# Patient Record
Sex: MALE | Race: WHITE | NOT HISPANIC OR LATINO | Employment: OTHER | URBAN - METROPOLITAN AREA
[De-identification: names, ages, dates, MRNs, and addresses within clinical notes are randomized per-mention and may not be internally consistent; named-entity substitution may affect disease eponyms.]

---

## 2024-09-06 ENCOUNTER — OFFICE VISIT (OUTPATIENT)
Dept: URGENT CARE | Facility: CLINIC | Age: 59
End: 2024-09-06
Payer: COMMERCIAL

## 2024-09-06 VITALS
DIASTOLIC BLOOD PRESSURE: 85 MMHG | OXYGEN SATURATION: 97 % | HEART RATE: 88 BPM | SYSTOLIC BLOOD PRESSURE: 141 MMHG | TEMPERATURE: 98 F | RESPIRATION RATE: 18 BRPM

## 2024-09-06 DIAGNOSIS — H57.89 IRRITATION OF EYE: ICD-10-CM

## 2024-09-06 DIAGNOSIS — K12.2 CELLULITIS OF ORAL SOFT TISSUES: Primary | ICD-10-CM

## 2024-09-06 PROBLEM — R41.1 ANTEROGRADE AMNESIA: Status: ACTIVE | Noted: 2024-01-24

## 2024-09-06 PROBLEM — G37.2: Status: ACTIVE | Noted: 2023-10-30

## 2024-09-06 PROBLEM — I60.9 SUBARACHNOID HEMORRHAGE (HCC): Status: ACTIVE | Noted: 2024-01-12

## 2024-09-06 PROBLEM — N18.2 CHRONIC KIDNEY DISEASE, STAGE 2 (MILD): Chronic | Status: ACTIVE | Noted: 2024-05-13

## 2024-09-06 PROBLEM — R41.89 BRAIN FOG: Status: ACTIVE | Noted: 2024-05-01

## 2024-09-06 PROBLEM — R53.2 FUNCTIONAL QUADRIPLEGIA (HCC): Status: ACTIVE | Noted: 2024-05-01

## 2024-09-06 PROBLEM — H26.9 CATARACT: Chronic | Status: ACTIVE | Noted: 2023-09-22

## 2024-09-06 PROBLEM — R26.2 AMBULATORY DYSFUNCTION: Status: ACTIVE | Noted: 2024-01-21

## 2024-09-06 PROBLEM — E22.2 SIADH (SYNDROME OF INAPPROPRIATE ADH PRODUCTION) (HCC): Status: ACTIVE | Noted: 2024-01-22

## 2024-09-06 PROBLEM — Q04.6 COLLOID CYST OF THIRD VENTRICLE (HCC): Status: ACTIVE | Noted: 2023-09-22

## 2024-09-06 PROBLEM — Z98.890 S/P CRANIOTOMY: Status: ACTIVE | Noted: 2024-01-22

## 2024-09-06 PROBLEM — Z79.01 ANTICOAGULANT LONG-TERM USE: Status: ACTIVE | Noted: 2024-01-22

## 2024-09-06 PROBLEM — N40.0 BENIGN PROSTATIC HYPERPLASIA: Chronic | Status: ACTIVE | Noted: 2023-09-22

## 2024-09-06 PROCEDURE — 99213 OFFICE O/P EST LOW 20 MIN: CPT | Performed by: EMERGENCY MEDICINE

## 2024-09-06 RX ORDER — GABAPENTIN 300 MG/1
300 CAPSULE ORAL 3 TIMES DAILY
COMMUNITY
Start: 2024-07-08

## 2024-09-06 RX ORDER — OMEPRAZOLE 40 MG/1
40 CAPSULE, DELAYED RELEASE ORAL DAILY
COMMUNITY

## 2024-09-06 RX ORDER — AMOXICILLIN 500 MG/1
500 CAPSULE ORAL EVERY 8 HOURS SCHEDULED
Qty: 21 CAPSULE | Refills: 0 | Status: SHIPPED | OUTPATIENT
Start: 2024-09-06 | End: 2024-09-13

## 2024-09-06 RX ORDER — CHLORHEXIDINE GLUCONATE ORAL RINSE 1.2 MG/ML
15 SOLUTION DENTAL 2 TIMES DAILY
Qty: 120 ML | Refills: 0 | Status: SHIPPED | OUTPATIENT
Start: 2024-09-06

## 2024-09-06 RX ORDER — DONEPEZIL HYDROCHLORIDE 5 MG/1
10 TABLET, FILM COATED ORAL
COMMUNITY
Start: 2024-07-31 | End: 2025-07-31

## 2024-09-06 RX ORDER — LEVOTHYROXINE SODIUM 50 UG/1
50 TABLET ORAL DAILY
COMMUNITY
Start: 2024-05-07 | End: 2025-07-30

## 2024-09-06 RX ORDER — MESALAMINE 400 MG/1
400 CAPSULE, DELAYED RELEASE ORAL 3 TIMES DAILY
COMMUNITY
Start: 2024-05-07

## 2024-09-06 RX ORDER — OLOPATADINE HYDROCHLORIDE 1 MG/ML
1 SOLUTION/ DROPS OPHTHALMIC 2 TIMES DAILY
Qty: 5 ML | Refills: 0 | Status: SHIPPED | OUTPATIENT
Start: 2024-09-06

## 2024-09-06 NOTE — PROGRESS NOTES
"  [facility]        NAME: Robert M Schoennagle is a 58 y.o. male  : 1965    MRN: [id number]  DATE: 2024  TIME: 12:44 PM    Assessment and Plan   Cellulitis of oral soft tissues [K12.2]  1. Cellulitis of oral soft tissues  chlorhexidine (PERIDEX) 0.12 % solution    amoxicillin (AMOXIL) 500 mg capsule      2. Irritation of eye  olopatadine (PATANOL) 0.1 % ophthalmic solution          Advised to use chlorhexidine mouth rinses 1-2 times daily as directed. Start amoxicillin as directed. Warm compresses for comfort.   May use pataday prn for eye irritations.     Patient Instructions       Follow up with PCP in 3-5 days.  Proceed to  ER if symptoms worsen.    If tests are performed, our office will contact you with results only if changes need to made to the care plan discussed with you at the visit. [facility]    Chief Complaint     Chief Complaint   Patient presents with    Oral Swelling     Started today right side          History of Present Illness       Right lower lip is swollen, no known trauma to the lip. Ate crab last night but has eaten this in the past. Significant other was concerned of a stroke but states she did a \"stroke exam\" and he appeared normal. Patient denies any pain of the teeth itself. SO states he is not the best with hygiene as he has short term memory loss from recent traumatic hospitalization.         Review of Systems   Review of Systems   Constitutional:  Negative for chills, fatigue and fever.   HENT:  Positive for facial swelling (right lower lip). Negative for congestion, dental problem, drooling, ear pain, postnasal drip, rhinorrhea, sinus pressure, sinus pain, sneezing, sore throat, trouble swallowing and voice change.    Respiratory:  Negative for cough, chest tightness and shortness of breath.    Neurological:  Negative for dizziness and headaches.         Current Medications       Current Outpatient " Medications:     amoxicillin (AMOXIL) 500 mg capsule, Take 1 capsule (500 mg total) by mouth every 8 (eight) hours for 7 days, Disp: 21 capsule, Rfl: 0    apixaban (ELIQUIS) 2.5 mg, Take 2.5 mg by mouth 2 (two) times a day, Disp: , Rfl:     chlorhexidine (PERIDEX) 0.12 % solution, Apply 15 mL to the mouth or throat 2 (two) times a day, Disp: 120 mL, Rfl: 0    donepezil (ARICEPT) 5 mg tablet, Take 10 mg by mouth, Disp: , Rfl:     gabapentin (NEURONTIN) 300 mg capsule, Take 300 mg by mouth Three times a day, Disp: , Rfl:     levothyroxine 50 mcg tablet, Take 50 mcg by mouth daily, Disp: , Rfl:     mesalamine (DELZICOL) 400 mg, Take 400 mg by mouth Three times a day, Disp: , Rfl:     olopatadine (PATANOL) 0.1 % ophthalmic solution, Administer 1 drop to both eyes 2 (two) times a day, Disp: 5 mL, Rfl: 0    omeprazole (PriLOSEC) 40 MG capsule, Take 40 mg by mouth daily, Disp: , Rfl:     Current Allergies     Allergies as of 09/06/2024 - Reviewed 09/06/2024   Allergen Reaction Noted    Pollen extract Other (See Comments) 04/19/2019            The following portions of the patient's history were reviewed and updated as appropriate: allergies, current medications, past family history, past medical history, past social history, past surgical history and problem list.     No past medical history on file.    No past surgical history on file.    No family history on file.      Medications have been verified.        Objective   /85   Pulse 88   Temp 98 °F (36.7 °C)   Resp 18   SpO2 97%        Physical Exam     Physical Exam  Vitals and nursing note reviewed.   Constitutional:       General: He is not in acute distress.     Appearance: Normal appearance. He is not ill-appearing.   HENT:      Head: Normocephalic and atraumatic.      Mouth/Throat:      Lips: Pink.      Mouth: Mucous membranes are moist. No lacerations, oral lesions or angioedema.      Dentition: No dental tenderness, dental caries or gum lesions.       Tongue: No lesions.      Palate: No mass.      Pharynx: Oropharynx is clear. No posterior oropharyngeal erythema.      Tonsils: No tonsillar exudate.        Comments: Quarter sized tender lump to the right lower lip.   Cardiovascular:      Rate and Rhythm: Normal rate and regular rhythm.      Heart sounds: Normal heart sounds, S1 normal and S2 normal.   Pulmonary:      Effort: Pulmonary effort is normal. No accessory muscle usage.      Breath sounds: Normal breath sounds. No wheezing.   Skin:     General: Skin is warm and dry.      Capillary Refill: Capillary refill takes less than 2 seconds.      Findings: No rash.   Neurological:      General: No focal deficit present.      Mental Status: He is alert and oriented to person, place, and time.      Motor: Motor function is intact.   Psychiatric:         Attention and Perception: Attention and perception normal.         Mood and Affect: Mood and affect normal.         Speech: Speech normal.         Behavior: Behavior normal. Behavior is cooperative.         Thought Content: Thought content normal.